# Patient Record
Sex: MALE | URBAN - METROPOLITAN AREA
[De-identification: names, ages, dates, MRNs, and addresses within clinical notes are randomized per-mention and may not be internally consistent; named-entity substitution may affect disease eponyms.]

---

## 2024-08-27 ENCOUNTER — CLINICAL SUPPORT (OUTPATIENT)
Dept: SMOKING CESSATION | Facility: CLINIC | Age: 49
End: 2024-08-27
Payer: COMMERCIAL

## 2024-08-27 DIAGNOSIS — F17.200 NICOTINE DEPENDENCE: Primary | ICD-10-CM

## 2024-08-27 PROCEDURE — 99404 PREV MED CNSL INDIV APPRX 60: CPT | Mod: S$GLB,,, | Performed by: GENERAL PRACTICE

## 2024-08-27 RX ORDER — IBUPROFEN 200 MG
1 TABLET ORAL DAILY
Qty: 28 PATCH | Refills: 0 | Status: SHIPPED | OUTPATIENT
Start: 2024-08-27

## 2024-08-27 RX ORDER — BUPROPION HYDROCHLORIDE 150 MG/1
150 TABLET, EXTENDED RELEASE ORAL 2 TIMES DAILY
Qty: 60 TABLET | Refills: 0 | Status: SHIPPED | OUTPATIENT
Start: 2024-08-27 | End: 2024-09-26

## 2024-08-27 NOTE — PROGRESS NOTES
Met with patient in clinic to conduct Quit 1 intake appointment. Patient will be participating in weekly tobacco cessation meetings and will begin the prescribed tobacco cessation medication 21 mg patches and Wellbutrin SR 150mg twice a day. FTND of 6 indicates a high level of dependence to tobacco. ALLY-D of 12 is perceived as no mental distress/ depression. CO2 level: 30

## 2024-09-10 ENCOUNTER — CLINICAL SUPPORT (OUTPATIENT)
Dept: SMOKING CESSATION | Facility: CLINIC | Age: 49
End: 2024-09-10
Payer: COMMERCIAL

## 2024-09-10 DIAGNOSIS — F17.200 NICOTINE DEPENDENCE: Primary | ICD-10-CM

## 2024-09-10 PROCEDURE — 99404 PREV MED CNSL INDIV APPRX 60: CPT | Mod: S$GLB,,, | Performed by: GENERAL PRACTICE

## 2024-09-10 NOTE — PROGRESS NOTES
Individual Follow-Up Form    9/10/2024    Quit Date: 9/10/24 tentative    Clinical Status of Patient: Outpatient    Length of Service: 60 minutes    Continuing Medication: yes  Wellbutrin    Other Medications:      Target Symptoms: Withdrawal and medication side effects. The following were rated moderate (3) to severe (4) on TCRS:  Moderate (3): none  Severe (4): none    Comments: CO2 level 21 Met with patient at length in clinic regarding tobacco cessation progress. The patient remains on the prescribed tobacco cessation medication regimen of Wellbutrin SR 150mg once daily without any negative side effects at this time. He did not get the 21mg nicotine patches. Patient smoke approximately a pack of cigarettes a day at initial appointment. Patient says that he has not has a cigarette since yesterday at 4:00 pm, so today 9/10/24,will be his tentative quit date. Commended him on the progress made. Patient wanted to quit smoking because he knows that its not good for him, he has high cholesterol and he is tried of the habit. Triggers are boredom. He smokes outside and his spouse does not smoke. Discussed strategies, cues, and triggers. Introduced the negative impact of tobacco on health, the health advantages of discontinuing the use of tobacco, time line improved health changes after a quit, withdrawal issues to expect from nicotine and habit, and ways to achieve the goal of a quit. The patient will continue with therapy sessions and medication monitoring by CTTS. Prescribed medication management will be by physician.        Diagnosis: F17.200    Next Visit: 2 weeks

## 2024-09-10 NOTE — Clinical Note
Met with patient at length in clinic regarding tobacco cessation progress. The patient remains on the prescribed tobacco cessation medication regimen of Wellbutrin SR 150mg once daily without any negative side effects at this time. He did not get the 21mg nicotine patches. Patient smoke approximately a pack of cigarettes a day at initial appointment.  Today 9/10/24,will be his tentative quit date. He wanted to quit smoking because he knows that its not good for him, he has high cholesterol and he is tried of the habit. Triggers are boredom. Discussed strategies, cues, and triggers. Introduced the negative impact of tobacco on health, the health advantages of discontinuing the use of tobacco, time line improved health changes after a quit, withdrawal issues to expect from nicotine and habit, and ways to achieve the goal of a quit. The patient will continue with therapy sessions and medication monitoring by CTTS. Prescribed medication management will be by physician.

## 2024-10-01 ENCOUNTER — CLINICAL SUPPORT (OUTPATIENT)
Dept: SMOKING CESSATION | Facility: CLINIC | Age: 49
End: 2024-10-01
Payer: COMMERCIAL

## 2024-10-01 DIAGNOSIS — F17.200 NICOTINE DEPENDENCE: Primary | ICD-10-CM

## 2024-10-01 PROCEDURE — 99404 PREV MED CNSL INDIV APPRX 60: CPT | Mod: S$GLB,,, | Performed by: GENERAL PRACTICE

## 2024-10-01 NOTE — PROGRESS NOTES
Individual Follow-Up Form    10/1/2024    Quit Date: 9/10/24    Clinical Status of Patient: Outpatient    Length of Service: 60 minutes    Continuing Medication: no    Other Medications: none     Target Symptoms: Withdrawal and medication side effects. The following were rated moderate (3) to severe (4) on TCRS:  Moderate (3): none  Severe (4): none    Comments: CO2 level 6 Met with patient at length in clinic regarding tobacco cessation progress. The patient discontinued the prescribed tobacco cessation medication regimen of Wellbutrin SR 150mg once daily due to not liking to take pills. He never r\ecieved the get the 21mg nicotine patches. Patient smoke approximately a pack of cigarettes a day at initial appointment. Patient says that he has not has a cigarette since our last appointment. His quit date is 9/10/24.  Commended him on the progress made. Patient is excited that he beat the habit but now he is eating up the $60 per week that he would be saving on smoking. He says he has gained 15 lbs in the last month. We discussed strategies, habitual behavior, stress, and high risk situations. Introduced stress with addition interventions, SOLVE, relaxation with interventions, nutrition, exercise, weight gain. The patient will continue with therapy sessions by CTTS. The patient denies any abnormal behavioral or mental changes at this time.       Diagnosis: F17.200    Next Visit: 2 weeks

## 2024-10-01 NOTE — Clinical Note
CO2 level 6 Met with patient at length in clinic regarding tobacco cessation progress. The patient discontinued the prescribed tobacco cessation medication regimen of Wellbutrin SR 150mg once daily due to not liking to take pills. He never r\ecieved the get the 21mg nicotine patches. Patient smoke approximately a pack of cigarettes a day at initial appointment. Patient says that he has not has a cigarette since our last appointment. His quit date is 9/10/24.  Commended him on the progress made. Patient is excited that he beat the habit but now he is eating up the $60 per week that he would be saving on smoking. He says he has gained 15 lbs in the last month. We discussed strategies, habitual behavior, stress, and high risk situations. Introduced stress with addition interventions, SOLVE, relaxation with interventions, nutrition, exercise, weight gain. The patient will continue with therapy sessions by CTTS. The patient denies any abnormal behavioral or mental changes at this time.

## 2024-10-22 ENCOUNTER — CLINICAL SUPPORT (OUTPATIENT)
Dept: SMOKING CESSATION | Facility: CLINIC | Age: 49
End: 2024-10-22
Payer: COMMERCIAL

## 2024-10-22 DIAGNOSIS — F17.200 NICOTINE DEPENDENCE: Primary | ICD-10-CM

## 2024-10-22 PROCEDURE — 99404 PREV MED CNSL INDIV APPRX 60: CPT | Mod: S$GLB,,, | Performed by: GENERAL PRACTICE

## 2024-10-22 NOTE — Clinical Note
CO2: level 6 Patient not on any tobacco cessation medication. His quit date remains 9/10/24. I commended him on staying quit. Patient says the only time he has the urge to smoke is once home and nothing to do. He mentions that he does not crave it nor have withdrawal symptoms. His only concern now if putting on weight and he can feel it with his breathing. He says that he knows he needs to eat healthier and exercise he just has to make efforts to do so. I encouraged him to do some high knees of go for a walk in the evenings at the time he would smoke after dinner. He stated that he quit drinking alcohol October 2021 and it was much easier. We discussed strategies, habitual behavior, stress, and high risk situations. Introduced stress with addition interventions, SOLVE, relaxation with interventions, nutrition, exercise, weight gain. The patient will continue with therapy sessions by CTTS. The patient denies any abnormal behavioral or mental changes at this time.

## 2024-10-22 NOTE — PROGRESS NOTES
Individual Follow-Up Form    10/22/2024    Quit Date: 9/10/24    Clinical Status of Patient: Outpatient    Length of Service: 60 minutes    Continuing Medication: no    Other Medications: none     Target Symptoms: Withdrawal and medication side effects. The following were rated moderate (3) to severe (4) on TCRS:  Moderate (3): none  Severe (4): none    Comments: CO2: level 6 Met with patient at length in clinic regarding tobacco cessation progress. Patient not on any tobacco cessation medication. His quit date remains 9/10/24. I commended him on staying quit. Patient says the only time he has the urge to smoke is once home and nothing to do. He mentions that he does not crave it nor have withdrawal symptoms. His only concern now if putting on weight and he can feel it with his breathing. He says that he knows he needs to eat healthier and exercise he just has to make efforts to do so. I encouraged him to do some high knees of go for a walk in the evenings at the time he would smoke after dinner. He stated that he quit drinking alcohol October 2021 and it was much easier. We discussed strategies, habitual behavior, stress, and high risk situations. Introduced stress with addition interventions, SOLVE, relaxation with interventions, nutrition, exercise, weight gain. The patient will continue with therapy sessions by CTTS. The patient denies any abnormal behavioral or mental changes at this time.     Diagnosis: F17.200    Next Visit: 2 weeks

## 2024-11-12 ENCOUNTER — CLINICAL SUPPORT (OUTPATIENT)
Dept: SMOKING CESSATION | Facility: CLINIC | Age: 49
End: 2024-11-12
Payer: COMMERCIAL

## 2024-11-12 DIAGNOSIS — F17.200 NICOTINE DEPENDENCE: Primary | ICD-10-CM

## 2024-11-12 PROCEDURE — 99404 PREV MED CNSL INDIV APPRX 60: CPT | Mod: S$GLB,,, | Performed by: GENERAL PRACTICE

## 2024-11-12 NOTE — Clinical Note
Patient not on any tobacco cessation medication. His quit date remains 9/10/24. Patient says he has the urge to smoke occasionally when driving he will reach for them or once home and bored. 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 We discussed getting back in the shop so that he can have a hobby. He mentions that he does not crave it nor have withdrawal symptoms. His says she was eating all the time but that has subsided in the last couple of weeks. He says that he knows he needs to eat healthier and exercise he just has to make efforts to do so. We discussed strategies, habitual behavior, stress, and high risk situations. Introduced stress with addition interventions, SOLVE, relaxation with interventions, nutrition, exercise, weight gain. The patient will continue with therapy sessions by CTTS. The patient denies any abnormal behavioral or mental changes at this time.

## 2024-11-12 NOTE — PROGRESS NOTES
Individual Follow-Up Form    11/12/2024    Quit Date: 9/10/24    Clinical Status of Patient: Outpatient    Length of Service: 60 minutes    Continuing Medication: no    Other Medications: none     Target Symptoms: Withdrawal and medication side effects. The following were  rated moderate (3) to severe (4) on TCRS:  Moderate (3): none  Severe (4): none    Comments: Met with patient at length in clinic regarding tobacco cessation progress. Patient not on any tobacco cessation medication. His quit date remains 9/10/24. I commended him on staying quit. Patient says he has the urge to smoke occasionally when driving he will reach for them or once home and bored. 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 We discussed getting back in the shop so that he can have a hobby. He mentions that he does not crave it nor have withdrawal symptoms. His says she was eating all the time but that has subsided in the last couple of weeks. He says that he knows he needs to eat healthier and exercise he just has to make efforts to do so. We discussed strategies, habitual behavior, stress, and high risk situations. Introduced stress with addition interventions, SOLVE, relaxation with interventions, nutrition, exercise, weight gain. The patient will continue with therapy sessions by CTTS. The patient denies any abnormal behavioral or mental changes at this time.     Diagnosis: F17.200    Next Visit: 2 weeks

## 2024-11-21 ENCOUNTER — TELEPHONE (OUTPATIENT)
Dept: SMOKING CESSATION | Facility: CLINIC | Age: 49
End: 2024-11-21
Payer: COMMERCIAL

## 2024-11-26 ENCOUNTER — CLINICAL SUPPORT (OUTPATIENT)
Dept: SMOKING CESSATION | Facility: CLINIC | Age: 49
End: 2024-11-26
Payer: COMMERCIAL

## 2024-11-26 DIAGNOSIS — F17.200 NICOTINE DEPENDENCE: Primary | ICD-10-CM

## 2024-11-26 PROCEDURE — 99404 PREV MED CNSL INDIV APPRX 60: CPT | Mod: S$GLB,,, | Performed by: GENERAL PRACTICE

## 2024-11-26 NOTE — PROGRESS NOTES
Individual Follow-Up Form    11/26/2024    Quit Date: TBD    Clinical Status of Patient: Outpatient    Length of Service: 60 minutes    Continuing Medication: no    Other Medications: none     Target Symptoms: Withdrawal and medication side effects. The following were rated moderate (3) to severe (4) on TCRS:  Moderate (3): none  Severe (4): none    Comments: CO2: 2 Met with patient at length in clinic regarding tobacco cessation. Patient not on any tobacco cessation medication. His quit date remains 9/10/24. I commended him on staying quit. Patient says he has no urge to smoke now. He has not started back  working in his shop at home as we discussed so that he can have a hobby. He mentions that he does not crave it nor have withdrawal symptoms. His says she was eating all the time but that has subsided in the last couple of weeks. He says that he knows he needs to eat healthier and exercise he just has to make efforts to do so. We discussed strategies, habitual behavior, stress of bible school, and high risk situations. Introduced stress with addition interventions, relaxation with interventions, nutrition, exercise, weight gain. The patient will continue with therapy sessions by CTTS. The patient denies any abnormal behavioral or mental changes at this time.     Diagnosis: F17.200    Next Visit: 2 weeks

## 2024-12-17 ENCOUNTER — CLINICAL SUPPORT (OUTPATIENT)
Dept: SMOKING CESSATION | Facility: CLINIC | Age: 49
End: 2024-12-17
Payer: COMMERCIAL

## 2024-12-17 DIAGNOSIS — F17.200 NICOTINE DEPENDENCE: Primary | ICD-10-CM

## 2024-12-17 PROCEDURE — 99404 PREV MED CNSL INDIV APPRX 60: CPT | Mod: S$GLB,,, | Performed by: GENERAL PRACTICE

## 2024-12-17 NOTE — PROGRESS NOTES
Individual Follow-Up Form    12/17/2024    Quit Date: 9/10/24    Clinical Status of Patient: Outpatient    Length of Service: 60 minutes    Continuing Medication: no    Other Medications: none     Target Symptoms: Withdrawal and medication side effects. The following were  rated moderate (3) to severe (4) on TCRS:  Moderate (3): none  Severe (4): none    Comments: CO2: 2 Met with patient at length in clinic regarding tobacco cessation. Patient not on any tobacco cessation medication. Patient remains quit date remains 9/10/24. I commended him on staying quit. Patient says he has no urge to smoke now.  Patient says he has the urge to smoke occasionally when driving he will reach for them or once home and bored. He mentions that he does not crave it nor have withdrawal symptoms. His says he was eating all the time but that has subsided in the last couple of weeks. He says that he knows he needs to eat healthier and exercise he just has to make efforts to do so. We discussed strategies, habitual behavior, stress of bible school, and high risk situations. Introduced stress with addition interventions, relaxation with interventions, nutrition, exercise, weight gain. The patient will continue with therapy sessions by CTTS. The patient denies any abnormal behavioral or mental changes at this time.     Diagnosis: F17.200    Next Visit: 2 weeks

## 2025-01-21 ENCOUNTER — TELEPHONE (OUTPATIENT)
Dept: SMOKING CESSATION | Facility: CLINIC | Age: 50
End: 2025-01-21
Payer: COMMERCIAL

## 2025-01-21 NOTE — TELEPHONE ENCOUNTER
Patient canceled follow up appt for smoking cessation today. I called to reschedule appt. Patient answered and stated he had a Zoom meeting he could not miss at the same time. Rescheduled appt for 2/4/25

## 2025-02-04 ENCOUNTER — CLINICAL SUPPORT (OUTPATIENT)
Dept: SMOKING CESSATION | Facility: CLINIC | Age: 50
End: 2025-02-04
Payer: COMMERCIAL

## 2025-02-04 DIAGNOSIS — F17.200 NICOTINE DEPENDENCE: Primary | ICD-10-CM

## 2025-02-04 PROCEDURE — 99404 PREV MED CNSL INDIV APPRX 60: CPT | Mod: S$GLB,,, | Performed by: GENERAL PRACTICE

## 2025-02-04 NOTE — PROGRESS NOTES
Individual Follow-Up Form    2/4/2025    Quit Date: 9/10/24    Clinical Status of Patient: Outpatient    Length of Service: 60 minutes    Continuing Medication: no    Other Medications: none     Target Symptoms: Withdrawal and medication side effects. The following were rated moderate (3) to severe (4) on TCRS:  Moderate (3): none  Severe (4): none    Comments: CO2 monitor not working. Met with patient at length in clinic regarding tobacco cessation. Patient not on any tobacco cessation medication. Patient remains quit date remains 9/10/24. I commended him on staying quit. Patient says he has no urge to smoke now even when driving or home and bored. 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 He says that he knows he needs to eat healthier and exercise he just has to make efforts to do so. We discussed strategies, habitual behavior, stress of bible school, and high risk situations. Introduced stress with addition interventions, relaxation with interventions, nutrition, exercise, weight gain. The patient will continue with therapy sessions by CTTS. The patient denies any abnormal behavioral or mental changes at this time.     Diagnosis: F17.200    Next Visit: 2 weeks

## 2025-02-04 NOTE — Clinical Note
CO2 monitor not working. Met with patient at length in clinic regarding tobacco cessation. Patient not on any tobacco cessation medication. Patient remains quit date remains 9/10/24. I commended him on staying quit. Patient says he has no urge to smoke now even when driving or home and bored. 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 He says that he knows he needs to eat healthier and exercise he just has to make efforts to do so. We discussed strategies, habitual behavior, stress of bible school, and high risk situations. Introduced stress with addition interventions, relaxation with interventions, nutrition, exercise, weight gain. The patient will continue with therapy sessions by CTTS. The patient denies any abnormal behavioral or mental changes at this time.

## 2025-02-19 ENCOUNTER — TELEPHONE (OUTPATIENT)
Dept: SMOKING CESSATION | Facility: CLINIC | Age: 50
End: 2025-02-19
Payer: COMMERCIAL

## 2025-03-05 ENCOUNTER — TELEPHONE (OUTPATIENT)
Dept: SMOKING CESSATION | Facility: CLINIC | Age: 50
End: 2025-03-05
Payer: COMMERCIAL

## 2025-03-05 NOTE — TELEPHONE ENCOUNTER
Called patient to reschedule canceled follow up appointment regarding tobacco cessation. No answer. Left a voice message with my contact information.

## 2025-03-11 ENCOUNTER — CLINICAL SUPPORT (OUTPATIENT)
Dept: SMOKING CESSATION | Facility: CLINIC | Age: 50
End: 2025-03-11
Payer: COMMERCIAL

## 2025-03-11 DIAGNOSIS — F17.200 NICOTINE DEPENDENCE: Primary | ICD-10-CM

## 2025-03-11 NOTE — Clinical Note
Met with patient at length in clinic regarding tobacco cessation. Patient not on any tobacco cessation medication. Patient remains quit date remains 9/10/24. I commended him on staying quit. Patient says he has no urge to smoke now even when driving or home and bored. 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 He says that he knows he needs to eat healthier and exercise he just has to make efforts to do so. We discussed strategies, habitual behavior, stress of bible school, and high risk situations. Introduced stress with addition interventions, relaxation with interventions, nutrition, exercise, weight gain. The patient will continue with therapy sessions by CTTS. The patient denies any abnormal behavioral or mental changes at this time.

## 2025-03-11 NOTE — PROGRESS NOTES
Individual Follow-Up Form    3/11/2025    Quit Date: 9/10/24    Clinical Status of Patient: Outpatient    Length of Service: 60 minutes    Continuing Medication: no    Other Medications: none     Target Symptoms: Withdrawal and medication side effects. The following were  rated moderate (3) to severe (4) on TCRS:  Moderate (3): none  Severe (4): none    Comments: Met with patient at length in clinic regarding tobacco cessation. Patient not on any tobacco cessation medication. Patient remains quit date remains 9/10/24. I commended him on staying quit. Patient says he has no urge to smoke now even when driving or home and bored. 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 He says that he knows he needs to eat healthier and exercise he just has to make efforts to do so. We discussed strategies, habitual behavior, stress of bible school, and high risk situations. Introduced stress with addition interventions, relaxation with interventions, nutrition, exercise, weight gain. The patient will continue with therapy sessions by CTTS. The patient denies any abnormal behavioral or mental changes at this time.     Diagnosis: F17.200    Next Visit: 2 weeks

## 2025-04-01 ENCOUNTER — CLINICAL SUPPORT (OUTPATIENT)
Dept: SMOKING CESSATION | Facility: CLINIC | Age: 50
End: 2025-04-01
Payer: COMMERCIAL

## 2025-04-01 DIAGNOSIS — F17.200 NICOTINE DEPENDENCE: Primary | ICD-10-CM

## 2025-04-01 PROCEDURE — 99404 PREV MED CNSL INDIV APPRX 60: CPT | Mod: S$GLB,,, | Performed by: GENERAL PRACTICE

## 2025-04-01 NOTE — PROGRESS NOTES
Individual Follow-Up Form    4/1/2025    Quit Date: 9/10/24    Clinical Status of Patient: Outpatient    Length of Service: 60 minutes    Continuing Medication: no    Other Medications: none     Target Symptoms: Withdrawal and medication side effects. The following were rated moderate (3) to severe (4) on TCRS:  Moderate (3): none  Severe (4): none    Comments: Met with patient at length in clinic regarding tobacco cessation progress. Patient not on any tobacco cessation medication. His quit date remains 9/10/24. I commended him on staying quit. Patient says the only time he has the urge to smoke is once home and nothing to do. He mentions that he does not crave it nor have withdrawal symptoms. His only concern now is putting on weight and he can feel it with his breathing. He says that he knows he needs to eat healthier and exercise he just has to make efforts to do so. I encouraged him to do some high knees or go for a walk in the evenings at the time he would smoke after dinner. We discussed strategies, habitual behavior, stress, and high risk situations. Introduced stress with addition interventions, SOLVE, relaxation with interventions, nutrition, exercise, weight gain. The patient will continue with therapy sessions by CTTS. The patient denies any abnormal behavioral or mental changes at this time.     Diagnosis: F17.200    Next Visit: 2 weeks

## 2025-04-29 ENCOUNTER — CLINICAL SUPPORT (OUTPATIENT)
Dept: SMOKING CESSATION | Facility: CLINIC | Age: 50
End: 2025-04-29
Payer: COMMERCIAL

## 2025-04-29 DIAGNOSIS — F17.200 NICOTINE DEPENDENCE: Primary | ICD-10-CM

## 2025-04-29 PROCEDURE — 99404 PREV MED CNSL INDIV APPRX 60: CPT | Mod: S$GLB,,, | Performed by: GENERAL PRACTICE

## 2025-04-29 NOTE — PROGRESS NOTES
Individual Follow-Up Form    4/29/2025    Quit Date: 9/10/24    Clinical Status of Patient: Outpatient    Length of Service: 60 minutes    Continuing Medication: no    Other Medications: none     Target Symptoms: Withdrawal and medication side effects. The following were  rated moderate (3) to severe (4) on TCRS:  Moderate (3): none  Severe (4): none    Comments: Met with patient at length in clinic regarding tobacco cessation. Patient not on any tobacco cessation medication. Patient remains quit date remains 9/10/24. I commended him on staying quit. Patient says he has no urge to smoke now even when driving or home and bored. He mentions that he does not crave it nor have withdrawal symptoms. 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 [place] a 5k this past week in 50 minutes. He says that he knows he needs to eat healthier and exercise he just has to make efforts to do so. We discussed strategies, habitual behavior, stress of bible school, and high risk situations. Introduced stress with addition interventions, relaxation with interventions, nutrition, exercise, weight gain. The patient will continue with therapy sessions by CTTS. The patient denies any abnormal behavioral or mental changes at this time.     Diagnosis: F17.200    Next Visit: 2 weeks

## 2025-05-20 ENCOUNTER — CLINICAL SUPPORT (OUTPATIENT)
Dept: SMOKING CESSATION | Facility: CLINIC | Age: 50
End: 2025-05-20
Payer: COMMERCIAL

## 2025-05-20 DIAGNOSIS — F17.200 NICOTINE DEPENDENCE: Primary | ICD-10-CM

## 2025-05-20 NOTE — PROGRESS NOTES
Individual Follow-Up Form    5/20/2025    Quit Date: 9/10/24    Clinical Status of Patient: Outpatient    Length of Service: 60 minutes    Continuing Medication: no    Other Medications: none     Target Symptoms: Withdrawal and medication side effects. The following were rated moderate (3) to severe (4) on TCRS:  Moderate (3): none  Severe (4): none    Comments: Met with patient at length in clinic regarding tobacco cessation. Patient not on any tobacco cessation medication. Patient remains quit as of 9/10/24. I commended him on staying quit. Patient says he has no urge or craving to smoke. He mentions that he feels good and sees the benefits of quitting bu breathing better and feel great for this accomplishment because it was the hardest time he ever had to do.  He says that he was making bad choices on food so now he is make better choice with eating salads and limiting sugars, He has another 5k run coming up and he wants to feel better and he know he will once he drops some weight and exercise. Reviewed strategies, cues, triggers, high risk situations, lapses, relapses, diet, exercise, stress, relaxation, sleep, habitual behavior, and life style changes. The patient will continue with therapy sessions by CTTS. The patient denies any abnormal behavioral or mental changes at this time. Patient's benefits end on 5/20/25. He will call me if he has stron cravings, slip or relapses.    Diagnosis: F17.200

## 2025-08-27 ENCOUNTER — TELEPHONE (OUTPATIENT)
Dept: SMOKING CESSATION | Facility: CLINIC | Age: 50
End: 2025-08-27